# Patient Record
Sex: MALE | Race: BLACK OR AFRICAN AMERICAN | Employment: FULL TIME | URBAN - METROPOLITAN AREA
[De-identification: names, ages, dates, MRNs, and addresses within clinical notes are randomized per-mention and may not be internally consistent; named-entity substitution may affect disease eponyms.]

---

## 2018-10-06 ENCOUNTER — HOSPITAL ENCOUNTER (EMERGENCY)
Age: 28
Discharge: HOME | End: 2018-10-06
Attending: EMERGENCY MEDICINE
Payer: COMMERCIAL

## 2018-10-06 VITALS
TEMPERATURE: 98.7 F | WEIGHT: 182.1 LBS | DIASTOLIC BLOOD PRESSURE: 76 MMHG | RESPIRATION RATE: 14 BRPM | HEIGHT: 74 IN | SYSTOLIC BLOOD PRESSURE: 128 MMHG | BODY MASS INDEX: 23.37 KG/M2 | HEART RATE: 73 BPM | OXYGEN SATURATION: 98 %

## 2018-10-06 DIAGNOSIS — L73.2 HIDRADENITIS AXILLARIS: Primary | ICD-10-CM

## 2018-10-06 PROCEDURE — 99283 EMERGENCY DEPT VISIT LOW MDM: CPT

## 2018-10-06 PROCEDURE — 4500000023 HC ED LEVEL 3 PROCEDURE

## 2018-10-06 RX ORDER — CEPHALEXIN 500 MG/1
500 CAPSULE ORAL 4 TIMES DAILY
Qty: 28 CAPSULE | Refills: 0 | Status: SHIPPED | OUTPATIENT
Start: 2018-10-06 | End: 2018-10-13

## 2018-10-15 ENCOUNTER — HOSPITAL ENCOUNTER (EMERGENCY)
Age: 28
Discharge: HOME | End: 2018-10-15
Payer: COMMERCIAL

## 2018-10-15 VITALS
SYSTOLIC BLOOD PRESSURE: 136 MMHG | HEIGHT: 74 IN | OXYGEN SATURATION: 100 % | WEIGHT: 184.75 LBS | RESPIRATION RATE: 16 BRPM | TEMPERATURE: 98.7 F | DIASTOLIC BLOOD PRESSURE: 86 MMHG | BODY MASS INDEX: 23.71 KG/M2 | HEART RATE: 66 BPM

## 2018-10-15 DIAGNOSIS — R10.13 ABDOMINAL PAIN, EPIGASTRIC: Primary | ICD-10-CM

## 2018-10-15 LAB
A/G RATIO: 1.1 (ref 1.1–2.2)
ALBUMIN SERPL-MCNC: 4.1 G/DL (ref 3.4–5)
ALP BLD-CCNC: 60 U/L (ref 40–129)
ALT SERPL-CCNC: 281 U/L (ref 10–40)
AMYLASE: 88 U/L (ref 25–115)
ANION GAP SERPL CALCULATED.3IONS-SCNC: 12 MMOL/L (ref 3–16)
AST SERPL-CCNC: 236 U/L (ref 15–37)
BASOPHILS ABSOLUTE: 0 K/UL (ref 0–0.2)
BASOPHILS RELATIVE PERCENT: 0.4 %
BILIRUB SERPL-MCNC: 0.7 MG/DL (ref 0–1)
BILIRUBIN URINE: NEGATIVE
BLOOD, URINE: NEGATIVE
BUN BLDV-MCNC: 12 MG/DL (ref 7–20)
CALCIUM SERPL-MCNC: 9.6 MG/DL (ref 8.3–10.6)
CHLORIDE BLD-SCNC: 102 MMOL/L (ref 99–110)
CLARITY: CLEAR
CO2: 25 MMOL/L (ref 21–32)
COLOR: YELLOW
CREAT SERPL-MCNC: 0.9 MG/DL (ref 0.9–1.3)
EOSINOPHILS ABSOLUTE: 0.1 K/UL (ref 0–0.6)
EOSINOPHILS RELATIVE PERCENT: 1.3 %
GFR AFRICAN AMERICAN: >60
GFR NON-AFRICAN AMERICAN: >60
GLOBULIN: 3.8 G/DL
GLUCOSE BLD-MCNC: 83 MG/DL (ref 70–99)
GLUCOSE URINE: NEGATIVE MG/DL
HCT VFR BLD CALC: 42.3 % (ref 40.5–52.5)
HEMOGLOBIN: 15.1 G/DL (ref 13.5–17.5)
KETONES, URINE: NEGATIVE MG/DL
LEUKOCYTE ESTERASE, URINE: NEGATIVE
LIPASE: 25 U/L (ref 13–60)
LYMPHOCYTES ABSOLUTE: 1.7 K/UL (ref 1–5.1)
LYMPHOCYTES RELATIVE PERCENT: 43.6 %
MCH RBC QN AUTO: 35 PG (ref 26–34)
MCHC RBC AUTO-ENTMCNC: 35.7 G/DL (ref 31–36)
MCV RBC AUTO: 98.1 FL (ref 80–100)
MICROSCOPIC EXAMINATION: NORMAL
MONOCYTES ABSOLUTE: 0.4 K/UL (ref 0–1.3)
MONOCYTES RELATIVE PERCENT: 9 %
NEUTROPHILS ABSOLUTE: 1.8 K/UL (ref 1.7–7.7)
NEUTROPHILS RELATIVE PERCENT: 45.7 %
NITRITE, URINE: NEGATIVE
PDW BLD-RTO: 13.8 % (ref 12.4–15.4)
PH UA: 6.5
PLATELET # BLD: 191 K/UL (ref 135–450)
PMV BLD AUTO: 6.8 FL (ref 5–10.5)
POTASSIUM SERPL-SCNC: 4.5 MMOL/L (ref 3.5–5.1)
PROTEIN UA: NEGATIVE MG/DL
RBC # BLD: 4.31 M/UL (ref 4.2–5.9)
SODIUM BLD-SCNC: 139 MMOL/L (ref 136–145)
SPECIFIC GRAVITY UA: 1.02
TOTAL PROTEIN: 7.9 G/DL (ref 6.4–8.2)
URINE REFLEX TO CULTURE: NORMAL
URINE TYPE: NORMAL
UROBILINOGEN, URINE: 1 E.U./DL
WBC # BLD: 3.9 K/UL (ref 4–11)

## 2018-10-15 PROCEDURE — 83690 ASSAY OF LIPASE: CPT

## 2018-10-15 PROCEDURE — 36415 COLL VENOUS BLD VENIPUNCTURE: CPT

## 2018-10-15 PROCEDURE — 81003 URINALYSIS AUTO W/O SCOPE: CPT

## 2018-10-15 PROCEDURE — 85025 COMPLETE CBC W/AUTO DIFF WBC: CPT

## 2018-10-15 PROCEDURE — 80053 COMPREHEN METABOLIC PANEL: CPT

## 2018-10-15 PROCEDURE — 99284 EMERGENCY DEPT VISIT MOD MDM: CPT

## 2018-10-15 PROCEDURE — 6370000000 HC RX 637 (ALT 250 FOR IP): Performed by: NURSE PRACTITIONER

## 2018-10-15 PROCEDURE — 82150 ASSAY OF AMYLASE: CPT

## 2018-10-15 RX ORDER — DICYCLOMINE HYDROCHLORIDE 10 MG/1
20 CAPSULE ORAL ONCE
Status: COMPLETED | OUTPATIENT
Start: 2018-10-15 | End: 2018-10-15

## 2018-10-15 RX ORDER — FAMOTIDINE 20 MG/1
40 TABLET, FILM COATED ORAL ONCE
Status: COMPLETED | OUTPATIENT
Start: 2018-10-15 | End: 2018-10-15

## 2018-10-15 RX ORDER — DICYCLOMINE HYDROCHLORIDE 10 MG/1
20 CAPSULE ORAL
Qty: 30 CAPSULE | Refills: 0 | Status: SHIPPED | OUTPATIENT
Start: 2018-10-15 | End: 2019-10-15

## 2018-10-15 RX ADMIN — LIDOCAINE HYDROCHLORIDE: 20 SOLUTION ORAL; TOPICAL at 15:34

## 2018-10-15 RX ADMIN — DICYCLOMINE HYDROCHLORIDE 20 MG: 10 CAPSULE ORAL at 16:55

## 2018-10-15 RX ADMIN — FAMOTIDINE 40 MG: 20 TABLET ORAL at 15:33

## 2018-10-15 ASSESSMENT — ENCOUNTER SYMPTOMS
BLOOD IN STOOL: 0
DIARRHEA: 0
CONSTIPATION: 0
ABDOMINAL DISTENTION: 0
RESPIRATORY NEGATIVE: 1
VOMITING: 0
ABDOMINAL PAIN: 1
NAUSEA: 0

## 2018-10-15 ASSESSMENT — PAIN DESCRIPTION - LOCATION: LOCATION: ABDOMEN

## 2018-10-15 ASSESSMENT — PAIN DESCRIPTION - PAIN TYPE: TYPE: ACUTE PAIN

## 2018-10-15 ASSESSMENT — PAIN SCALES - GENERAL: PAINLEVEL_OUTOF10: 4

## 2018-10-15 NOTE — ED PROVIDER NOTES
[address] [address]  Dept: [address]: [phone]  eMERGENCYdEPARTMENT eNCOUnter      Pt Name: Myrl Severs  MRN: [id number]  W[place]angfgetachew 1990  Date of evaluation: 10/15/2018  Provider:MONY Kellogg CNP     Evaluated by the dance practice provider    [address] [place] [address]       Chief Complaint   Patient presents with    Abdominal Pain     \"I have a knot in my stomach. \"  pt points to epigastric area and says \"the knot moves around. \"  pt noticed this on thursday but today it has become intermittently painful.  pain up to 10 at times but now is a 4.  no N/V/D.  no known fevers. hx of HIV. CRITICAL CARE TIME         HISTORY OF PRESENT ILLNESS  (Location/Symptom, Timing/Onset, Context/Setting, Quality, Duration,Modifying Factors, Severity.)   Myrl Severs is a 33 y.o. male who presents to the emergency department Complaining of epigastric intermittent sharp shooting abdominal pain since Thursday. Negative fever, chills, diarrhea, hematemesis, melena. No change in appetite. Pain is intermittently sharp, moves up and down the mid abdomen. Patient reports he is actually very hungry. Has an appointment with Dr. Obdulia Soto tomorrow. Nursing Notes were reviewedand agreed with or any disagreements were addressed in the HPI. REVIEW OF SYSTEMS    (2-9 systems for level 4, 10 or more for level 5)     Review of Systems   Constitutional: Negative for activity change, appetite change, chills, fatigue and fever. HENT: Negative. Respiratory: Negative. Cardiovascular: Negative. HX heart murmur   Gastrointestinal: Positive for abdominal pain. Negative for abdominal distention, blood in stool, constipation, diarrhea, nausea and vomiting. Genitourinary: Negative. Musculoskeletal: Negative. Allergic/Immunologic:        History of HIV, currently on antiretrovirals   Hematological: Negative. Except as noted above the remainder of the review of systems was reviewed and negative. PAST MEDICAL HISTORY         Diagnosis Date    HIV (human immunodeficiency virus infection) ([facility] [address]        SURGICAL HISTORY     History reviewed. No pertinent surgical history. CURRENT MEDICATIONS     [unfilled]    ALLERGIES     Nickel    FAMILY HISTORY     History reviewed. No pertinent family history. No family status information on file. SOCIAL HISTORY      reports that he has never smoked. He has never used smokeless tobacco. He reports that he drinks alcohol. He reports that he uses drugs, including Marijuana. PHYSICAL EXAM    (up to 7 for level 4, 8 or more for level 5)     ED Triage Vitals   Enc Vitals Group      BP       Pulse       Resp       Temp       Temp src       SpO2       Weight       Height       Head Circumference       Peak Flow       Pain Score       Pain Loc       Pain Edu? Excl. in [address]? Physical Exam   Constitutional: He is oriented to person, place, and time. He appears well-developed and well-nourished. No distress. HENT:   Head: Normocephalic. Eyes: Pupils are equal, round, and reactive to light. No scleral icterus. Neck: Normal range of motion. Cardiovascular: Normal rate, regular rhythm and intact distal pulses. Exam reveals no gallop and no friction rub. Murmur heard. Pulmonary/Chest: Effort normal and breath sounds normal.   Abdominal: Soft. Bowel sounds are normal. He exhibits no mass. There is tenderness. There is no rebound and no guarding. No hernia. Lymphadenopathy:        Head (right side): No tonsillar adenopathy present. Head (left side): No tonsillar adenopathy present. He has no cervical adenopathy. He has no axillary adenopathy. Right: No inguinal adenopathy present. Left: No inguinal adenopathy present. Neurological: He is alert and oriented to person, place, and time. Skin: Skin is warm and dry. Capillary refill takes less than 2 seconds. No rash noted. He is not diaphoretic. No erythema. No pallor. Nursing note and vitals reviewed. DIAGNOSTIC RESULTS     EKG: All EKG's are interpreted by the Emergency Department Physician who either signs or Co-signs this chart in the absence of a cardiologist.    RADIOLOGY:   Non-plain film images such as CT, Ultrasound and MRI are read by the radiologist. Plain radiographic images are visualized and preliminarilyinterpreted by the emergency physician with the below findings:    Interpretation per the Radiologist below,if available at the time of this note:    No orders to display         LABS:  Labs Reviewed   CBC WITH AUTO DIFFERENTIAL - Abnormal; Notable for the following:        Result Value    WBC 3.9 (*)     MCH 35.0 (*)     All other components within normal limits    Narrative:     Performed at:  [address] [place] [address]  [address] [place], [facility]   Phone [phone]   COMPREHENSIVE METABOLIC PANEL - Abnormal; Notable for the following:      (*)      (*)     All other components within normal limits    Narrative:     Performed at:  [address] [place] [address]  [address] [place], [facility]   Phone [phone]   LIPASE    Narrative:     Performed at:  [facility]  [address] [place], [facility]   Phone [phone]   AMYLASE    Narrative:     Performed at:  [facility]  [address], [facility]   Phone [phone]   URINE RT REFLEX TO CULTURE    Narrative:     Performed at:  [facility]  [address], [facility]   Phone [phone]       All other labs were within normal range or not returned as of this dictation.     EMERGENCY DEPARTMENT COURSE and DIFFERENTIAL DIAGNOSIS/MDM: Pepcid. He states after receiving it he initially felt better but then he started feeling pain radiated higher in the epigastrium and the lower towards the umbilicus. More of a cramping intermittent sharp sensation. Although he states he feeling this he states some really hungry and I'm not nauseous. At this point in time I think this may be some gastritis and possibly esophagitis, and or duodenal inflammation developing possibly secondary to his antivirals. At any rate there is no evidence that would warrant any further workup at this time. I'm given a refer him to follow-up and keep his appointment with Dr. Ashvin Ingram tomorrow and discuss the current elevation in the liver enzymes and his symptoms. I'm also going to refer him to primary care. I will provide him a prescription of Bentyl for symptomatic treatment and have him avoid any type of spicy fried foods. All diagnostic study results, impressions and plan of care were discussed with this patient and he was in agreement he is discharged home in good condition. He was naturally instructed to return for development of fever, vomiting, vomiting of blood, and/or blood in the stool or diarrhea. CONSULTS:  None    PROCEDURES:  Procedures    FINAL IMPRESSION      1.  Abdominal pain, epigastric          DISPOSITION/PLAN   [unfilled]    [facility]  [facility]  [phone]  Schedule an appointment as soon as possible for a visit in 3 days      Mitra Trotter MD  [address] [place],[address]  [address] [facility]  [phone]      Keep appt for tomorrow      DISCHARGE MEDICATIONS:  Discharge Medication List as of 10/15/2018  4:49 PM      START taking these medications    Details   dicyclomine (BENTYL) 10 MG capsule Take 2 capsules by mouth 4 times daily (before meals and nightly), Disp-30 capsule, R-0Print             (Please note that portions of this note were completed with a voice recognition program.  Efforts were made to edit the dictations but

## 2018-10-22 NOTE — ED NOTES
Maribel ordered explained and given before discharge for abd pain at 4. Discharge instructions with pt.  abd pain teaching with pt. Explained rx. Explained how to get a family doctor. Social work phone number given for assistance with this.   Home ambulatory      Donny Stewart RN  10/22/18 4912

## 2018-10-22 NOTE — ED NOTES
abd pain at 4.  meds ordered explained and given.   Encouraged NPO for 1 hour     Paris Gillis, RN  10/22/18 5929